# Patient Record
Sex: FEMALE | Race: WHITE
[De-identification: names, ages, dates, MRNs, and addresses within clinical notes are randomized per-mention and may not be internally consistent; named-entity substitution may affect disease eponyms.]

---

## 2021-05-21 ENCOUNTER — HOSPITAL ENCOUNTER (INPATIENT)
Dept: HOSPITAL 94 - ER | Age: 81
LOS: 1 days | Discharge: HOME HEALTH | DRG: 640 | End: 2021-05-22
Attending: GENERAL PRACTICE | Admitting: GENERAL PRACTICE
Payer: MEDICARE

## 2021-05-21 VITALS — DIASTOLIC BLOOD PRESSURE: 71 MMHG | SYSTOLIC BLOOD PRESSURE: 176 MMHG

## 2021-05-21 VITALS — DIASTOLIC BLOOD PRESSURE: 76 MMHG | SYSTOLIC BLOOD PRESSURE: 188 MMHG

## 2021-05-21 VITALS — WEIGHT: 129.28 LBS | HEIGHT: 62 IN | BODY MASS INDEX: 23.79 KG/M2

## 2021-05-21 VITALS — SYSTOLIC BLOOD PRESSURE: 165 MMHG | DIASTOLIC BLOOD PRESSURE: 56 MMHG

## 2021-05-21 VITALS — DIASTOLIC BLOOD PRESSURE: 56 MMHG | SYSTOLIC BLOOD PRESSURE: 165 MMHG

## 2021-05-21 VITALS — DIASTOLIC BLOOD PRESSURE: 65 MMHG | SYSTOLIC BLOOD PRESSURE: 185 MMHG

## 2021-05-21 DIAGNOSIS — Z88.0: ICD-10-CM

## 2021-05-21 DIAGNOSIS — Z91.013: ICD-10-CM

## 2021-05-21 DIAGNOSIS — D64.9: ICD-10-CM

## 2021-05-21 DIAGNOSIS — E04.2: ICD-10-CM

## 2021-05-21 DIAGNOSIS — I48.92: ICD-10-CM

## 2021-05-21 DIAGNOSIS — Z90.49: ICD-10-CM

## 2021-05-21 DIAGNOSIS — Z79.01: ICD-10-CM

## 2021-05-21 DIAGNOSIS — N18.30: ICD-10-CM

## 2021-05-21 DIAGNOSIS — D35.00: ICD-10-CM

## 2021-05-21 DIAGNOSIS — E86.0: Primary | ICD-10-CM

## 2021-05-21 DIAGNOSIS — I65.22: ICD-10-CM

## 2021-05-21 DIAGNOSIS — Z95.5: ICD-10-CM

## 2021-05-21 DIAGNOSIS — Z88.8: ICD-10-CM

## 2021-05-21 DIAGNOSIS — N17.0: ICD-10-CM

## 2021-05-21 DIAGNOSIS — E87.6: ICD-10-CM

## 2021-05-21 DIAGNOSIS — Z79.899: ICD-10-CM

## 2021-05-21 DIAGNOSIS — R55: ICD-10-CM

## 2021-05-21 DIAGNOSIS — Z79.82: ICD-10-CM

## 2021-05-21 DIAGNOSIS — E87.1: ICD-10-CM

## 2021-05-21 DIAGNOSIS — E78.5: ICD-10-CM

## 2021-05-21 DIAGNOSIS — I25.10: ICD-10-CM

## 2021-05-21 DIAGNOSIS — I12.9: ICD-10-CM

## 2021-05-21 DIAGNOSIS — Z87.891: ICD-10-CM

## 2021-05-21 DIAGNOSIS — Z90.710: ICD-10-CM

## 2021-05-21 LAB
ALBUMIN SERPL BCP-MCNC: 2.5 G/DL (ref 3.4–5)
ALBUMIN/GLOB SERPL: 0.6 {RATIO} (ref 1.1–1.5)
ALP SERPL-CCNC: 64 IU/L (ref 46–116)
ALT SERPL W P-5'-P-CCNC: 22 U/L (ref 12–78)
ANION GAP SERPL CALCULATED.3IONS-SCNC: 11 MMOL/L (ref 8–16)
APTT PPP: 36 SECONDS (ref 22–32)
AST SERPL W P-5'-P-CCNC: 20 U/L (ref 10–37)
BASOPHILS # BLD AUTO: 0 X10'3 (ref 0–0.2)
BASOPHILS NFR BLD AUTO: 0.4 % (ref 0–1)
BILIRUB SERPL-MCNC: 0.3 MG/DL (ref 0.1–1)
BUN SERPL-MCNC: 28 MG/DL (ref 7–18)
BUN/CREAT SERPL: 18.4 (ref 6.6–38)
CALCIUM SERPL-MCNC: 8.3 MG/DL (ref 8.5–10.1)
CHLORIDE SERPL-SCNC: 97 MMOL/L (ref 99–107)
CO2 SERPL-SCNC: 25.6 MMOL/L (ref 24–32)
CREAT SERPL-MCNC: 1.52 MG/DL (ref 0.4–0.9)
EOSINOPHIL # BLD AUTO: 0.1 X10'3 (ref 0–0.9)
EOSINOPHIL NFR BLD AUTO: 1.6 % (ref 0–6)
ERYTHROCYTE [DISTWIDTH] IN BLOOD BY AUTOMATED COUNT: 16.1 % (ref 11.5–14.5)
ERYTHROCYTE [DISTWIDTH] IN BLOOD BY AUTOMATED COUNT: 16.3 % (ref 11.5–14.5)
GFR SERPL CREATININE-BSD FRML MDRD: 33 ML/MIN
GLUCOSE SERPL-MCNC: 108 MG/DL (ref 70–104)
HCT VFR BLD AUTO: 23.6 % (ref 35–45)
HCT VFR BLD AUTO: 25.1 % (ref 35–45)
HGB BLD-MCNC: 7.8 G/DL (ref 12–16)
HGB BLD-MCNC: 8.2 G/DL (ref 12–16)
LYMPHOCYTES # BLD AUTO: 0.7 X10'3 (ref 1.1–4.8)
LYMPHOCYTES NFR BLD AUTO: 13.7 % (ref 21–51)
MAGNESIUM SERPL-MCNC: 1.8 MG/DL (ref 1.5–2.4)
MCH RBC QN AUTO: 26.8 PG (ref 27–31)
MCH RBC QN AUTO: 27 PG (ref 27–31)
MCHC RBC AUTO-ENTMCNC: 32.5 G/DL (ref 33–36.5)
MCHC RBC AUTO-ENTMCNC: 33 G/DL (ref 33–36.5)
MCV RBC AUTO: 81.1 FL (ref 78–98)
MCV RBC AUTO: 82.9 FL (ref 78–98)
MONOCYTES # BLD AUTO: 0.6 X10'3 (ref 0–0.9)
MONOCYTES NFR BLD AUTO: 10.8 % (ref 2–12)
NEUTROPHILS # BLD AUTO: 3.8 X10'3 (ref 1.8–7.7)
NEUTROPHILS NFR BLD AUTO: 73.5 % (ref 42–75)
PLATELET # BLD AUTO: 237 X10'3 (ref 140–440)
PLATELET # BLD AUTO: 250 X10'3 (ref 140–440)
PMV BLD AUTO: 6.6 FL (ref 7.4–10.4)
PMV BLD AUTO: 7 FL (ref 7.4–10.4)
POTASSIUM SERPL-SCNC: 3.4 MMOL/L (ref 3.5–5.1)
PROT SERPL-MCNC: 6.5 G/DL (ref 6.4–8.2)
RBC # BLD AUTO: 2.91 X10'6 (ref 4.2–5.6)
RBC # BLD AUTO: 3.03 X10'6 (ref 4.2–5.6)
SODIUM SERPL-SCNC: 134 MMOL/L (ref 135–145)
WBC # BLD AUTO: 4.7 X10'3 (ref 4.5–11)
WBC # BLD AUTO: 5.2 X10'3 (ref 4.5–11)

## 2021-05-21 PROCEDURE — 85730 THROMBOPLASTIN TIME PARTIAL: CPT

## 2021-05-21 PROCEDURE — 93005 ELECTROCARDIOGRAM TRACING: CPT

## 2021-05-21 PROCEDURE — 97530 THERAPEUTIC ACTIVITIES: CPT

## 2021-05-21 PROCEDURE — 85610 PROTHROMBIN TIME: CPT

## 2021-05-21 PROCEDURE — 74176 CT ABD & PELVIS W/O CONTRAST: CPT

## 2021-05-21 PROCEDURE — 83735 ASSAY OF MAGNESIUM: CPT

## 2021-05-21 PROCEDURE — 71045 X-RAY EXAM CHEST 1 VIEW: CPT

## 2021-05-21 PROCEDURE — 87081 CULTURE SCREEN ONLY: CPT

## 2021-05-21 PROCEDURE — 84443 ASSAY THYROID STIM HORMONE: CPT

## 2021-05-21 PROCEDURE — 76536 US EXAM OF HEAD AND NECK: CPT

## 2021-05-21 PROCEDURE — 84439 ASSAY OF FREE THYROXINE: CPT

## 2021-05-21 PROCEDURE — 85025 COMPLETE CBC W/AUTO DIFF WBC: CPT

## 2021-05-21 PROCEDURE — 80053 COMPREHEN METABOLIC PANEL: CPT

## 2021-05-21 PROCEDURE — 97161 PT EVAL LOW COMPLEX 20 MIN: CPT

## 2021-05-21 PROCEDURE — 36415 COLL VENOUS BLD VENIPUNCTURE: CPT

## 2021-05-21 PROCEDURE — 84484 ASSAY OF TROPONIN QUANT: CPT

## 2021-05-21 PROCEDURE — 85027 COMPLETE CBC AUTOMATED: CPT

## 2021-05-21 PROCEDURE — 82272 OCCULT BLD FECES 1-3 TESTS: CPT

## 2021-05-21 PROCEDURE — 83880 ASSAY OF NATRIURETIC PEPTIDE: CPT

## 2021-05-21 PROCEDURE — 99285 EMERGENCY DEPT VISIT HI MDM: CPT

## 2021-05-21 RX ADMIN — DIATRIZOATE MEGLUMINE AND DIATRIZOATE SODIUM SCH ML: 660; 100 LIQUID ORAL; RECTAL at 21:00

## 2021-05-21 RX ADMIN — POTASSIUM CHLORIDE PRN MEQ: 1500 TABLET, EXTENDED RELEASE ORAL at 20:51

## 2021-05-21 RX ADMIN — METOPROLOL TARTRATE SCH MG: 25 TABLET, FILM COATED ORAL at 20:52

## 2021-05-21 NOTE — NUR
Paging Tracy regarding 2 diet orders and needing clarification



PAGER ID: [phone]

MESSAGE: JUSTIN Doty RN [phone], New Admit [facility] coming to RM 3012A has 2 diet 
orders. please confirm either heart healthy or NPO

## 2021-05-21 NOTE — NUR
Patient in room PCU 3012. I have received report from  katerina cote   and had the opportunity 
to ask questions and assume patient care.

## 2021-05-21 NOTE — NUR
Problems reprioritized. Patient report given, questions answered & plan of care reviewed 
with Jace DIAZ.

## 2021-05-21 NOTE — NUR
notified md or positive orthos and elevated bp. Also asked Dr. Nixon for melatonin and 
something prn for pt for bp

## 2021-05-21 NOTE — NUR
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 md said 
to hold gastrografin since she has an allergy and no current abdominal symptoms

## 2021-05-22 VITALS — SYSTOLIC BLOOD PRESSURE: 170 MMHG | DIASTOLIC BLOOD PRESSURE: 64 MMHG

## 2021-05-22 VITALS — SYSTOLIC BLOOD PRESSURE: 148 MMHG | DIASTOLIC BLOOD PRESSURE: 66 MMHG

## 2021-05-22 VITALS — DIASTOLIC BLOOD PRESSURE: 66 MMHG | SYSTOLIC BLOOD PRESSURE: 132 MMHG

## 2021-05-22 VITALS — DIASTOLIC BLOOD PRESSURE: 68 MMHG | SYSTOLIC BLOOD PRESSURE: 140 MMHG

## 2021-05-22 VITALS — DIASTOLIC BLOOD PRESSURE: 69 MMHG | SYSTOLIC BLOOD PRESSURE: 170 MMHG

## 2021-05-22 LAB
ALBUMIN SERPL BCP-MCNC: 2.5 G/DL (ref 3.4–5)
ALBUMIN/GLOB SERPL: 0.6 {RATIO} (ref 1.1–1.5)
ALP SERPL-CCNC: 58 IU/L (ref 46–116)
ALT SERPL W P-5'-P-CCNC: 23 U/L (ref 12–78)
ANION GAP SERPL CALCULATED.3IONS-SCNC: 9 MMOL/L (ref 8–16)
AST SERPL W P-5'-P-CCNC: 22 U/L (ref 10–37)
BILIRUB SERPL-MCNC: 0.4 MG/DL (ref 0.1–1)
BUN SERPL-MCNC: 24 MG/DL (ref 7–18)
BUN/CREAT SERPL: 16.4 (ref 6.6–38)
CALCIUM SERPL-MCNC: 8.6 MG/DL (ref 8.5–10.1)
CHLORIDE SERPL-SCNC: 100 MMOL/L (ref 99–107)
CO2 SERPL-SCNC: 24.4 MMOL/L (ref 24–32)
CREAT SERPL-MCNC: 1.46 MG/DL (ref 0.4–0.9)
ERYTHROCYTE [DISTWIDTH] IN BLOOD BY AUTOMATED COUNT: 16.1 % (ref 11.5–14.5)
ERYTHROCYTE [DISTWIDTH] IN BLOOD BY AUTOMATED COUNT: 16.3 % (ref 11.5–14.5)
GFR SERPL CREATININE-BSD FRML MDRD: 34 ML/MIN
GLUCOSE SERPL-MCNC: 90 MG/DL (ref 70–104)
HCT VFR BLD AUTO: 24.4 % (ref 35–45)
HCT VFR BLD AUTO: 26.3 % (ref 35–45)
HEMOCCULT STL QL: NEGATIVE
HGB BLD-MCNC: 8.1 G/DL (ref 12–16)
HGB BLD-MCNC: 8.7 G/DL (ref 12–16)
MAGNESIUM SERPL-MCNC: 1.7 MG/DL (ref 1.5–2.4)
MCH RBC QN AUTO: 26.9 PG (ref 27–31)
MCH RBC QN AUTO: 26.9 PG (ref 27–31)
MCHC RBC AUTO-ENTMCNC: 33 G/DL (ref 33–36.5)
MCHC RBC AUTO-ENTMCNC: 33 G/DL (ref 33–36.5)
MCV RBC AUTO: 81.4 FL (ref 78–98)
MCV RBC AUTO: 81.6 FL (ref 78–98)
PLATELET # BLD AUTO: 255 X10'3 (ref 140–440)
PLATELET # BLD AUTO: 297 X10'3 (ref 140–440)
PMV BLD AUTO: 6.7 FL (ref 7.4–10.4)
PMV BLD AUTO: 7 FL (ref 7.4–10.4)
POTASSIUM SERPL-SCNC: 3.7 MMOL/L (ref 3.5–5.1)
PROT SERPL-MCNC: 6.4 G/DL (ref 6.4–8.2)
RBC # BLD AUTO: 3 X10'6 (ref 4.2–5.6)
RBC # BLD AUTO: 3.22 X10'6 (ref 4.2–5.6)
SODIUM SERPL-SCNC: 133 MMOL/L (ref 135–145)
WBC # BLD AUTO: 5.3 X10'3 (ref 4.5–11)
WBC # BLD AUTO: 5.3 X10'3 (ref 4.5–11)

## 2021-05-22 RX ADMIN — POTASSIUM CHLORIDE PRN MEQ: 1500 TABLET, EXTENDED RELEASE ORAL at 05:57

## 2021-05-22 RX ADMIN — DIATRIZOATE MEGLUMINE AND DIATRIZOATE SODIUM SCH ML: 660; 100 LIQUID ORAL; RECTAL at 06:49

## 2021-05-22 RX ADMIN — POTASSIUM CHLORIDE PRN MEQ: 1500 TABLET, EXTENDED RELEASE ORAL at 01:06

## 2021-05-22 RX ADMIN — METOPROLOL TARTRATE SCH MG: 25 TABLET, FILM COATED ORAL at 08:21

## 2021-05-22 NOTE — NUR
6 hr and 12 hr ekg was completed by rocael mendoza in the [facility], the 6 hr one was completed at 
1800 and 12 hr one was completed at 0000

## 2021-05-22 NOTE — NUR
Dr Molina is discussing CT of abdomen and pelvis and Ultra sound of thyroid results with 
patient in detail, prior to discharge. Dr. Molina is also discussing discharge from hospital 
with patient and answering questions she has.

## 2021-05-22 NOTE — NUR
Paged Dr. Molina regarding oral contrast for CT abd and pelvis held due to allergy to 
shellfish.



PAGER ID: [phone]

MESSAGE: JUSTIN Doty RN [phone], RM [id number] Darcie Lisa last night contrast Gastrografin was held 
by Dr. Nixon due to pat has allergy to shellfish.

## 2021-05-22 NOTE — NUR
Problems reprioritized. Patient report given, questions answered & plan of care reviewed 
with katerina cote.

## 2021-05-22 NOTE — NUR
Patient in room PCU 3012. I have received report from   Jace DIAZ and had the opportunity to 
ask questions and assume patient care.

## 2021-05-22 NOTE — NUR
Problems reprioritized. Patient report given, questions answered & plan of care reviewed 
with alyson cote.

## 2021-05-22 NOTE — NUR
Removed Telemetry and PIV with cannula intact, no redness or irritation at PIV site, Patient 
stable and comfortable at discharge. Spent time with patient and patient's friend reviewing 
discharge information and education. Answered all questions patient and patient's friend had 
about discharge information and education. Both patient and patient's friend were able to 
verbalize back all patient discharge information and education. Patient was wheeled out in 
wheelchair to lobby accompanied by patient's friend and staff member. Patient left hospital 
with friend in private vehicle.

## 2021-05-22 NOTE — NUR
gastrografin no administered b/c patient has a severe shellfish allergy which 
contraindicates the medication. Dr Patel said not to administer and did not give any other 
orders

## 2021-05-22 NOTE — NUR
paged DR. Molina regarding patient back from CT no results yet, patient requesting to eat.



PAGER ID: [phone]

MESSAGE: JUSTIN Doty RN 54, pt Darcie Lisa RM [id number] has completed CT abd & pelvis no 
results at this time, please advise when she can eat heart health diet

## 2021-05-24 NOTE — NUR
CASE MANAGEMENT DISCHARGE FOLLOW UP: Spoke with pt via telephone. Reports that she is doing 
really good, "feel[ing] better than [she] has in a long time," does admit to DANIELS that 
resolves spontaneously with rest, pt states not wearing O2 as O2 saturations around 98-99% 
on RA and she was instructed to wear O2 only if SpO2 less than 90%; denies CP, SOB, 
weakness, syncope/near-syncope, bleeding. Verbalizes understanding of s/sx requiring further 
evaluation/emergent assistance. Verbalizes understanding of medications. Verbalizes 
compliance with MD discharge instructions. Verbalizes understanding of the importance in 
making/keeping follow-up appointments, has f/u appointment scheduled with her PMD. Pt aware 
that she needs have f/u CT Chest 3-6 months and 18-24 months, also aware needs to f/u on 
adrenal mass as well as thyroid nodule. Pt states that PT will come out to work with her 
tomorrow (5/25/21). States no further questions/concerns at this time.

## 2022-03-24 ENCOUNTER — HOSPITAL ENCOUNTER (INPATIENT)
Dept: HOSPITAL 94 - ER | Age: 82
LOS: 5 days | Discharge: HOME HEALTH | DRG: 242 | End: 2022-03-29
Attending: INTERNAL MEDICINE | Admitting: INTERNAL MEDICINE
Payer: MEDICARE

## 2022-03-24 VITALS — DIASTOLIC BLOOD PRESSURE: 62 MMHG | SYSTOLIC BLOOD PRESSURE: 95 MMHG

## 2022-03-24 VITALS — DIASTOLIC BLOOD PRESSURE: 75 MMHG | SYSTOLIC BLOOD PRESSURE: 176 MMHG

## 2022-03-24 VITALS — SYSTOLIC BLOOD PRESSURE: 118 MMHG | DIASTOLIC BLOOD PRESSURE: 59 MMHG

## 2022-03-24 VITALS — DIASTOLIC BLOOD PRESSURE: 47 MMHG | SYSTOLIC BLOOD PRESSURE: 121 MMHG

## 2022-03-24 VITALS — DIASTOLIC BLOOD PRESSURE: 56 MMHG | SYSTOLIC BLOOD PRESSURE: 112 MMHG

## 2022-03-24 VITALS — WEIGHT: 138.89 LBS | HEIGHT: 64 IN | BODY MASS INDEX: 23.71 KG/M2

## 2022-03-24 VITALS — SYSTOLIC BLOOD PRESSURE: 212 MMHG | DIASTOLIC BLOOD PRESSURE: 80 MMHG

## 2022-03-24 VITALS — DIASTOLIC BLOOD PRESSURE: 39 MMHG | SYSTOLIC BLOOD PRESSURE: 101 MMHG

## 2022-03-24 VITALS — DIASTOLIC BLOOD PRESSURE: 35 MMHG | SYSTOLIC BLOOD PRESSURE: 136 MMHG

## 2022-03-24 VITALS — DIASTOLIC BLOOD PRESSURE: 89 MMHG | SYSTOLIC BLOOD PRESSURE: 152 MMHG

## 2022-03-24 DIAGNOSIS — I12.9: ICD-10-CM

## 2022-03-24 DIAGNOSIS — I48.0: ICD-10-CM

## 2022-03-24 DIAGNOSIS — Z95.5: ICD-10-CM

## 2022-03-24 DIAGNOSIS — Z87.891: ICD-10-CM

## 2022-03-24 DIAGNOSIS — R78.81: ICD-10-CM

## 2022-03-24 DIAGNOSIS — E78.5: ICD-10-CM

## 2022-03-24 DIAGNOSIS — N17.9: ICD-10-CM

## 2022-03-24 DIAGNOSIS — M25.512: ICD-10-CM

## 2022-03-24 DIAGNOSIS — Z79.02: ICD-10-CM

## 2022-03-24 DIAGNOSIS — Z88.8: ICD-10-CM

## 2022-03-24 DIAGNOSIS — Z79.899: ICD-10-CM

## 2022-03-24 DIAGNOSIS — Z90.49: ICD-10-CM

## 2022-03-24 DIAGNOSIS — Z91.013: ICD-10-CM

## 2022-03-24 DIAGNOSIS — R55: ICD-10-CM

## 2022-03-24 DIAGNOSIS — J96.01: ICD-10-CM

## 2022-03-24 DIAGNOSIS — I44.2: Primary | ICD-10-CM

## 2022-03-24 DIAGNOSIS — J45.909: ICD-10-CM

## 2022-03-24 DIAGNOSIS — I48.92: ICD-10-CM

## 2022-03-24 DIAGNOSIS — D64.9: ICD-10-CM

## 2022-03-24 DIAGNOSIS — R00.1: ICD-10-CM

## 2022-03-24 DIAGNOSIS — N18.32: ICD-10-CM

## 2022-03-24 DIAGNOSIS — I25.10: ICD-10-CM

## 2022-03-24 LAB
ALBUMIN SERPL BCP-MCNC: 3.4 G/DL (ref 3.4–5)
ALBUMIN/GLOB SERPL: 1.2 {RATIO} (ref 1.1–1.5)
ALP SERPL-CCNC: 58 IU/L (ref 46–116)
ALT SERPL W P-5'-P-CCNC: 29 U/L (ref 12–78)
ANION GAP SERPL CALCULATED.3IONS-SCNC: 15 MMOL/L (ref 8–16)
APTT PPP: 23 SECONDS (ref 22–32)
ARTERIAL PATENCY WRIST A: POSITIVE
AST SERPL W P-5'-P-CCNC: 29 U/L (ref 10–37)
BASE EXCESS BLDA CALC-SCNC: -4.6 MMOL/L (ref -2–2)
BASE EXCESS BLDA CALC-SCNC: -4.9 MMOL/L (ref -2–2)
BASOPHILS # BLD AUTO: 0 X10'3 (ref 0–0.2)
BASOPHILS NFR BLD AUTO: 0.3 % (ref 0–1)
BILIRUB SERPL-MCNC: 0.2 MG/DL (ref 0.1–1)
BODY TEMPERATURE: 37
BODY TEMPERATURE: 37
BUN SERPL-MCNC: 51 MG/DL (ref 7–18)
BUN/CREAT SERPL: 26.4 (ref 6.6–38)
CALCIUM SERPL-MCNC: 8.2 MG/DL (ref 8.5–10.1)
CHLORIDE SERPL-SCNC: 106 MMOL/L (ref 99–107)
CO2 SERPL-SCNC: 22.4 MMOL/L (ref 24–32)
COHGB MFR BLDA: 0.3 % (ref 0–3.9)
COHGB MFR BLDA: 0.6 % (ref 0–3.9)
CREAT SERPL-MCNC: 1.93 MG/DL (ref 0.4–0.9)
EOSINOPHIL # BLD AUTO: 0 X10'3 (ref 0–0.9)
EOSINOPHIL NFR BLD AUTO: 0.9 % (ref 0–6)
EOSINOPHIL NFR BLD MANUAL: 2 % (ref 0–6)
ERYTHROCYTE [DISTWIDTH] IN BLOOD BY AUTOMATED COUNT: 14.5 % (ref 11.5–14.5)
GFR SERPL CREATININE-BSD FRML MDRD: 25 ML/MIN
GLUCOSE SERPL-MCNC: 143 MG/DL (ref 70–104)
HCO3 BLDA-SCNC: 21.8 MMOL/L (ref 22–26)
HCO3 BLDA-SCNC: 22.5 MMOL/L (ref 22–26)
HCT VFR BLD AUTO: 29.3 % (ref 35–45)
HGB BLD-MCNC: 9.6 G/DL (ref 12–16)
HGB BLDA-MCNC: 10.6 G/DL (ref 12–16)
HGB BLDA-MCNC: 9 G/DL (ref 12–16)
LDLC SERPL DIRECT ASSAY-MCNC: 104 MG/DL (ref 50–100)
LYMPHOCYTES # BLD AUTO: 0.6 X10'3 (ref 1.1–4.8)
LYMPHOCYTES NFR BLD AUTO: 20.9 % (ref 21–51)
LYMPHOCYTES NFR BLD MANUAL: 18 % (ref 21–51)
MCH RBC QN AUTO: 29.3 PG (ref 27–31)
MCHC RBC AUTO-ENTMCNC: 32.8 G/DL (ref 33–36.5)
MCV RBC AUTO: 89.1 FL (ref 78–98)
METHGB MFR BLDA: 0 % (ref 0–1.5)
METHGB MFR BLDA: 0.3 % (ref 0–1.5)
MONOCYTES # BLD AUTO: 0.2 X10'3 (ref 0–0.9)
MONOCYTES NFR BLD AUTO: 6.6 % (ref 2–12)
MONOCYTES NFR BLD MANUAL: 7 % (ref 2–12)
NEUTROPHILS # BLD AUTO: 1.9 X10'3 (ref 1.8–7.7)
NEUTROPHILS NFR BLD AUTO: 71.3 % (ref 42–75)
NEUTS BAND # BLD MANUAL: 1 % (ref 0–10)
NEUTS SEG NFR BLD MANUAL: 72 % (ref 42–75)
O2/TOTAL GAS SETTING VFR VENT: 100 MMHG/%
O2/TOTAL GAS SETTING VFR VENT: 30 MMHG/%
OXYHGB MFR BLDA: 90.7 % (ref 94–97)
OXYHGB MFR BLDA: 98.9 % (ref 94–97)
PCO2 TEMP ADJ BLDA: 46.3 MMHG (ref 32–45)
PCO2 TEMP ADJ BLDA: 52.3 MMHG (ref 32–45)
PEEP RESPIRATORY: 5 CM H2O
PEEP RESPIRATORY: 5 CM H2O
PLATELET # BLD AUTO: 117 X10'3 (ref 140–440)
PLATELET BLD QL SMEAR: (no result)
PMV BLD AUTO: 8 FL (ref 7.4–10.4)
PO2 TEMP ADJ BLD: 443 MMHG (ref 75–100)
PO2 TEMP ADJ BLD: 69.7 MMHG (ref 75–100)
POTASSIUM SERPL-SCNC: 4.2 MMOL/L (ref 3.5–5.1)
PROT SERPL-MCNC: 6.2 G/DL (ref 6.4–8.2)
RBC # BLD AUTO: 3.29 X10'6 (ref 4.2–5.6)
RBC MORPH BLD: NORMAL
RESP RATE 1H: 12 B/MIN
RESP RATE 1H: 16 B/MIN
SAO2 % BLDA: 91.5 % (ref 94–97)
SAO2 % BLDA: 99.2 % (ref 94–97)
SODIUM SERPL-SCNC: 143 MMOL/L (ref 135–145)
SPONT VT COMPRES GAS VOL SET UNCOR VENT: 400 ML
SPONT VT COMPRES GAS VOL SET UNCOR VENT: 450 ML
TOTAL CELLS COUNTED FLD: 100
WBC # BLD AUTO: 2.7 X10'3 (ref 4.5–11)

## 2022-03-24 PROCEDURE — 5A1945Z RESPIRATORY VENTILATION, 24-96 CONSECUTIVE HOURS: ICD-10-PCS | Performed by: EMERGENCY MEDICINE

## 2022-03-24 PROCEDURE — 5A1223Z PERFORMANCE OF CARDIAC PACING, CONTINUOUS: ICD-10-PCS | Performed by: EMERGENCY MEDICINE

## 2022-03-24 PROCEDURE — 29700 RMVL/BIVLV GAUNTLET BOOT/CST: CPT

## 2022-03-24 PROCEDURE — 04JY3ZZ INSPECTION OF LOWER ARTERY, PERCUTANEOUS APPROACH: ICD-10-PCS | Performed by: EMERGENCY MEDICINE

## 2022-03-24 PROCEDURE — 99153 MOD SED SAME PHYS/QHP EA: CPT

## 2022-03-24 PROCEDURE — 87070 CULTURE OTHR SPECIMN AEROBIC: CPT

## 2022-03-24 PROCEDURE — 94799 UNLISTED PULMONARY SVC/PX: CPT

## 2022-03-24 PROCEDURE — 94640 AIRWAY INHALATION TREATMENT: CPT

## 2022-03-24 PROCEDURE — 02H63JZ INSERTION OF PACEMAKER LEAD INTO RIGHT ATRIUM, PERCUTANEOUS APPROACH: ICD-10-PCS | Performed by: EMERGENCY MEDICINE

## 2022-03-24 PROCEDURE — 71045 X-RAY EXAM CHEST 1 VIEW: CPT

## 2022-03-24 PROCEDURE — 83735 ASSAY OF MAGNESIUM: CPT

## 2022-03-24 PROCEDURE — 93005 ELECTROCARDIOGRAM TRACING: CPT

## 2022-03-24 PROCEDURE — 84443 ASSAY THYROID STIM HORMONE: CPT

## 2022-03-24 PROCEDURE — 85018 HEMOGLOBIN: CPT

## 2022-03-24 PROCEDURE — 94003 VENT MGMT INPAT SUBQ DAY: CPT

## 2022-03-24 PROCEDURE — 84484 ASSAY OF TROPONIN QUANT: CPT

## 2022-03-24 PROCEDURE — 87040 BLOOD CULTURE FOR BACTERIA: CPT

## 2022-03-24 PROCEDURE — 36600 WITHDRAWAL OF ARTERIAL BLOOD: CPT

## 2022-03-24 PROCEDURE — 99291 CRITICAL CARE FIRST HOUR: CPT

## 2022-03-24 PROCEDURE — 0BH17EZ INSERTION OF ENDOTRACHEAL AIRWAY INTO TRACHEA, VIA NATURAL OR ARTIFICIAL OPENING: ICD-10-PCS | Performed by: EMERGENCY MEDICINE

## 2022-03-24 PROCEDURE — 33208 INSRT HEART PM ATRIAL & VENT: CPT

## 2022-03-24 PROCEDURE — 84439 ASSAY OF FREE THYROXINE: CPT

## 2022-03-24 PROCEDURE — B54BZZA ULTRASONOGRAPHY OF RIGHT LOWER EXTREMITY VEINS, GUIDANCE: ICD-10-PCS | Performed by: EMERGENCY MEDICINE

## 2022-03-24 PROCEDURE — 85025 COMPLETE CBC W/AUTO DIFF WBC: CPT

## 2022-03-24 PROCEDURE — 83721 ASSAY OF BLOOD LIPOPROTEIN: CPT

## 2022-03-24 PROCEDURE — 0JH607Z INSERTION OF CARDIAC RESYNCHRONIZATION PACEMAKER PULSE GENERATOR INTO CHEST SUBCUTANEOUS TISSUE AND FASCIA, OPEN APPROACH: ICD-10-PCS | Performed by: EMERGENCY MEDICINE

## 2022-03-24 PROCEDURE — 36415 COLL VENOUS BLD VENIPUNCTURE: CPT

## 2022-03-24 PROCEDURE — 80053 COMPREHEN METABOLIC PANEL: CPT

## 2022-03-24 PROCEDURE — 82948 REAGENT STRIP/BLOOD GLUCOSE: CPT

## 2022-03-24 PROCEDURE — 99152 MOD SED SAME PHYS/QHP 5/>YRS: CPT

## 2022-03-24 PROCEDURE — 82803 BLOOD GASES ANY COMBINATION: CPT

## 2022-03-24 PROCEDURE — 97530 THERAPEUTIC ACTIVITIES: CPT

## 2022-03-24 PROCEDURE — 85007 BL SMEAR W/DIFF WBC COUNT: CPT

## 2022-03-24 PROCEDURE — 94760 N-INVAS EAR/PLS OXIMETRY 1: CPT

## 2022-03-24 PROCEDURE — 83880 ASSAY OF NATRIURETIC PEPTIDE: CPT

## 2022-03-24 PROCEDURE — 85730 THROMBOPLASTIN TIME PARTIAL: CPT

## 2022-03-24 PROCEDURE — 02HK3JZ INSERTION OF PACEMAKER LEAD INTO RIGHT VENTRICLE, PERCUTANEOUS APPROACH: ICD-10-PCS | Performed by: EMERGENCY MEDICINE

## 2022-03-24 PROCEDURE — 84145 PROCALCITONIN (PCT): CPT

## 2022-03-24 PROCEDURE — 94002 VENT MGMT INPAT INIT DAY: CPT

## 2022-03-24 PROCEDURE — 83605 ASSAY OF LACTIC ACID: CPT

## 2022-03-24 PROCEDURE — 06HY33Z INSERTION OF INFUSION DEVICE INTO LOWER VEIN, PERCUTANEOUS APPROACH: ICD-10-PCS | Performed by: EMERGENCY MEDICINE

## 2022-03-24 PROCEDURE — 97161 PT EVAL LOW COMPLEX 20 MIN: CPT

## 2022-03-24 PROCEDURE — 84100 ASSAY OF PHOSPHORUS: CPT

## 2022-03-24 PROCEDURE — 93306 TTE W/DOPPLER COMPLETE: CPT

## 2022-03-24 RX ADMIN — SODIUM CHLORIDE SCH MLS/HR: 9 INJECTION INTRAMUSCULAR; INTRAVENOUS; SUBCUTANEOUS at 12:45

## 2022-03-24 RX ADMIN — DOCUSATE SODIUM SCH MG: 100 CAPSULE, LIQUID FILLED ORAL at 20:00

## 2022-03-24 RX ADMIN — HEPARIN SODIUM SCH UNIT: 5000 INJECTION, SOLUTION INTRAVENOUS; SUBCUTANEOUS at 21:57

## 2022-03-24 RX ADMIN — Medication PRN MLS/HR: at 09:00

## 2022-03-24 RX ADMIN — Medication PRN MLS/HR: at 23:15

## 2022-03-24 RX ADMIN — SODIUM CHLORIDE SCH MLS/HR: 9 INJECTION INTRAMUSCULAR; INTRAVENOUS; SUBCUTANEOUS at 11:55

## 2022-03-24 RX ADMIN — Medication PRN MLS/HR: at 22:35

## 2022-03-24 RX ADMIN — SODIUM CHLORIDE SCH MLS/HR: 9 INJECTION INTRAMUSCULAR; INTRAVENOUS; SUBCUTANEOUS at 21:55

## 2022-03-24 RX ADMIN — Medication PRN MLS/HR: at 08:49

## 2022-03-24 NOTE — NUR
Patient up to floor from ED and placed on bedside monitor at 1800. Heart rate paced at 80bpm 
via trans venous pacer in through the R. IJ. 40ml/hour of fluid running through introducer 
sheath. Patient awake and oriented in room and following commands. Fentanyl at 35mcg and 
Versed at 4mg/hour. Right groin with notable hematoma where ART line removed in ED; improved 
per ED RN. Right lower extremity cooler than left; dorsalis pulse dopplered and present. 
Skin clear. Vital signs stable at this time. Trinh RN at bedside with all concerns noted 
and all questions answered.

## 2022-03-24 NOTE — NUR
PT INTUBATED AT 0829, 7.5 ETT, 22 AT TEETH.

0827- 20MG ETOMIDATE

0828- 100MG EDWIN 

0831- 0.5MG ATROPINE

## 2022-03-24 NOTE — NUR
pt bp is low 85/55 map is 65 ,consulted with nora cote who has icu experience 
,as per her just decrease the versed to 3 mg/hr and see if that can help with 
the bp .will keep an eye on pt bp and pt sedation staus .

## 2022-03-24 NOTE — NUR
VERBAL ORDER FOR N.S 1L BOLUS FOR LOW BP ,PT EF IS OKAY AS CHECKED BY DR CHE ,US TECH AT BEDSIDE .HAILY MCCURDY AND DR BELCHER ALONG WITH PT 
GRAND DAUGHTER AT BEDSIDE ,DISCUSSING THE POC.

## 2022-03-24 NOTE — NUR
Problems reprioritized. Patient report given, questions answered & plan of care reviewed 
with Trinh DIAZ.

## 2022-03-24 NOTE — NUR
pt bp gets low with sedation meds ,pt bp was in 80's on 50 mcg/hr of fentnyl 
and 5 mg/hr versed .titrated the fentnyl to 35 mcg/hr and versed to 3 mg/hr ,pt 
bp went up to 154/62.retitrated the versed drip to 4 and given 1 mg bolus 
versed and kept the fentnyl at 35 mcg hr.

jairo nurse at bedside.

## 2022-03-25 VITALS — SYSTOLIC BLOOD PRESSURE: 150 MMHG | DIASTOLIC BLOOD PRESSURE: 67 MMHG

## 2022-03-25 VITALS — DIASTOLIC BLOOD PRESSURE: 48 MMHG | SYSTOLIC BLOOD PRESSURE: 145 MMHG

## 2022-03-25 VITALS — DIASTOLIC BLOOD PRESSURE: 83 MMHG | SYSTOLIC BLOOD PRESSURE: 125 MMHG

## 2022-03-25 VITALS — SYSTOLIC BLOOD PRESSURE: 101 MMHG | DIASTOLIC BLOOD PRESSURE: 38 MMHG

## 2022-03-25 VITALS — SYSTOLIC BLOOD PRESSURE: 130 MMHG | DIASTOLIC BLOOD PRESSURE: 45 MMHG

## 2022-03-25 VITALS — SYSTOLIC BLOOD PRESSURE: 139 MMHG | DIASTOLIC BLOOD PRESSURE: 61 MMHG

## 2022-03-25 VITALS — SYSTOLIC BLOOD PRESSURE: 131 MMHG | DIASTOLIC BLOOD PRESSURE: 49 MMHG

## 2022-03-25 VITALS — SYSTOLIC BLOOD PRESSURE: 125 MMHG | DIASTOLIC BLOOD PRESSURE: 48 MMHG

## 2022-03-25 VITALS — DIASTOLIC BLOOD PRESSURE: 39 MMHG | SYSTOLIC BLOOD PRESSURE: 131 MMHG

## 2022-03-25 VITALS — SYSTOLIC BLOOD PRESSURE: 128 MMHG | DIASTOLIC BLOOD PRESSURE: 41 MMHG

## 2022-03-25 VITALS — DIASTOLIC BLOOD PRESSURE: 52 MMHG | SYSTOLIC BLOOD PRESSURE: 166 MMHG

## 2022-03-25 VITALS — DIASTOLIC BLOOD PRESSURE: 31 MMHG | SYSTOLIC BLOOD PRESSURE: 94 MMHG

## 2022-03-25 VITALS — SYSTOLIC BLOOD PRESSURE: 171 MMHG | DIASTOLIC BLOOD PRESSURE: 68 MMHG

## 2022-03-25 VITALS — SYSTOLIC BLOOD PRESSURE: 154 MMHG | DIASTOLIC BLOOD PRESSURE: 49 MMHG

## 2022-03-25 VITALS — DIASTOLIC BLOOD PRESSURE: 58 MMHG | SYSTOLIC BLOOD PRESSURE: 145 MMHG

## 2022-03-25 VITALS — SYSTOLIC BLOOD PRESSURE: 155 MMHG | DIASTOLIC BLOOD PRESSURE: 63 MMHG

## 2022-03-25 VITALS — DIASTOLIC BLOOD PRESSURE: 98 MMHG | SYSTOLIC BLOOD PRESSURE: 169 MMHG

## 2022-03-25 VITALS — DIASTOLIC BLOOD PRESSURE: 70 MMHG | SYSTOLIC BLOOD PRESSURE: 142 MMHG

## 2022-03-25 VITALS — DIASTOLIC BLOOD PRESSURE: 49 MMHG | SYSTOLIC BLOOD PRESSURE: 160 MMHG

## 2022-03-25 VITALS — SYSTOLIC BLOOD PRESSURE: 105 MMHG | DIASTOLIC BLOOD PRESSURE: 37 MMHG

## 2022-03-25 VITALS — DIASTOLIC BLOOD PRESSURE: 44 MMHG | SYSTOLIC BLOOD PRESSURE: 112 MMHG

## 2022-03-25 VITALS — DIASTOLIC BLOOD PRESSURE: 65 MMHG | SYSTOLIC BLOOD PRESSURE: 145 MMHG

## 2022-03-25 VITALS — SYSTOLIC BLOOD PRESSURE: 142 MMHG | DIASTOLIC BLOOD PRESSURE: 61 MMHG

## 2022-03-25 LAB
ALBUMIN SERPL BCP-MCNC: 3.2 G/DL (ref 3.4–5)
ALBUMIN/GLOB SERPL: 1.2 {RATIO} (ref 1.1–1.5)
ALP SERPL-CCNC: 44 IU/L (ref 46–116)
ALT SERPL W P-5'-P-CCNC: 29 U/L (ref 12–78)
ANION GAP SERPL CALCULATED.3IONS-SCNC: 15 MMOL/L (ref 8–16)
ARTERIAL PATENCY WRIST A: POSITIVE
ARTERIAL PATENCY WRIST A: POSITIVE
AST SERPL W P-5'-P-CCNC: 27 U/L (ref 10–37)
BASE EXCESS BLDA CALC-SCNC: -5 MMOL/L (ref -2–2)
BASE EXCESS BLDA CALC-SCNC: -6.7 MMOL/L (ref -2–2)
BASOPHILS # BLD AUTO: 0 X10'3 (ref 0–0.2)
BASOPHILS NFR BLD AUTO: 0.2 % (ref 0–1)
BILIRUB SERPL-MCNC: 0.3 MG/DL (ref 0.1–1)
BODY TEMPERATURE: 37.5
BODY TEMPERATURE: 38.2
BUN SERPL-MCNC: 44 MG/DL (ref 7–18)
BUN/CREAT SERPL: 28.8 (ref 6.6–38)
CALCIUM SERPL-MCNC: 8.4 MG/DL (ref 8.5–10.1)
CHLORIDE SERPL-SCNC: 109 MMOL/L (ref 99–107)
CO2 SERPL-SCNC: 20.5 MMOL/L (ref 24–32)
COHGB MFR BLDA: 0.3 % (ref 0–3.9)
COHGB MFR BLDA: 0.3 % (ref 0–3.9)
CREAT SERPL-MCNC: 1.53 MG/DL (ref 0.4–0.9)
EOSINOPHIL # BLD AUTO: 0 X10'3 (ref 0–0.9)
EOSINOPHIL NFR BLD AUTO: 0.3 % (ref 0–6)
ERYTHROCYTE [DISTWIDTH] IN BLOOD BY AUTOMATED COUNT: 14.8 % (ref 11.5–14.5)
GFR SERPL CREATININE-BSD FRML MDRD: 32 ML/MIN
GLUCOSE SERPL-MCNC: 111 MG/DL (ref 70–104)
HCO3 BLDA-SCNC: 17.7 MMOL/L (ref 22–26)
HCO3 BLDA-SCNC: 19.2 MMOL/L (ref 22–26)
HCT VFR BLD AUTO: 27.8 % (ref 35–45)
HGB BLD-MCNC: 9.1 G/DL (ref 12–16)
HGB BLDA-MCNC: 9.3 G/DL (ref 12–16)
HGB BLDA-MCNC: 9.4 G/DL (ref 12–16)
LYMPHOCYTES # BLD AUTO: 1 X10'3 (ref 1.1–4.8)
LYMPHOCYTES NFR BLD AUTO: 13 % (ref 21–51)
MAGNESIUM SERPL-MCNC: 2.2 MG/DL (ref 1.5–2.4)
MCH RBC QN AUTO: 29 PG (ref 27–31)
MCHC RBC AUTO-ENTMCNC: 32.7 G/DL (ref 33–36.5)
MCV RBC AUTO: 88.6 FL (ref 78–98)
METHGB MFR BLDA: 0.2 % (ref 0–1.5)
METHGB MFR BLDA: 0.3 % (ref 0–1.5)
MONOCYTES # BLD AUTO: 0.6 X10'3 (ref 0–0.9)
MONOCYTES NFR BLD AUTO: 8.5 % (ref 2–12)
NEUTROPHILS # BLD AUTO: 5.8 X10'3 (ref 1.8–7.7)
NEUTROPHILS NFR BLD AUTO: 78 % (ref 42–75)
O2/TOTAL GAS SETTING VFR VENT: 35 MMHG/%
O2/TOTAL GAS SETTING VFR VENT: 45 MMHG/%
OXYHGB MFR BLDA: 95.2 % (ref 94–97)
OXYHGB MFR BLDA: 96.9 % (ref 94–97)
PCO2 TEMP ADJ BLDA: 31.6 MMHG (ref 32–45)
PCO2 TEMP ADJ BLDA: 34.4 MMHG (ref 32–45)
PEEP RESPIRATORY: 5 CM H2O
PHOSPHATE SERPL-MCNC: 3.1 MG/DL (ref 2.3–4.5)
PLATELET # BLD AUTO: 142 X10'3 (ref 140–440)
PMV BLD AUTO: 8.7 FL (ref 7.4–10.4)
PO2 TEMP ADJ BLD: 117.3 MMHG (ref 75–100)
PO2 TEMP ADJ BLD: 93.3 MMHG (ref 75–100)
POTASSIUM SERPL-SCNC: 4.2 MMOL/L (ref 3.5–5.1)
PROT SERPL-MCNC: 5.8 G/DL (ref 6.4–8.2)
RBC # BLD AUTO: 3.13 X10'6 (ref 4.2–5.6)
RESP RATE 1H: 12 B/MIN
SAO2 % BLDA: 95.7 % (ref 94–97)
SAO2 % BLDA: 97.5 % (ref 94–97)
SODIUM SERPL-SCNC: 144 MMOL/L (ref 135–145)
SPONT VT COMPRES GAS VOL SET UNCOR VENT: 450 ML
WBC # BLD AUTO: 7.5 X10'3 (ref 4.5–11)

## 2022-03-25 RX ADMIN — MINERAL OIL, PETROLATUM SCH INCH: 425; 568 OINTMENT OPHTHALMIC at 19:22

## 2022-03-25 RX ADMIN — METHYLPREDNISOLONE SODIUM SUCCINATE SCH MG: 40 INJECTION, POWDER, FOR SOLUTION INTRAMUSCULAR; INTRAVENOUS at 23:09

## 2022-03-25 RX ADMIN — MINERAL OIL, PETROLATUM SCH INCH: 425; 568 OINTMENT OPHTHALMIC at 14:00

## 2022-03-25 RX ADMIN — HEPARIN SODIUM SCH UNIT: 5000 INJECTION, SOLUTION INTRAVENOUS; SUBCUTANEOUS at 19:22

## 2022-03-25 RX ADMIN — PANTOPRAZOLE SODIUM SCH MLS/HR: 40 INJECTION, POWDER, FOR SOLUTION INTRAVENOUS at 07:57

## 2022-03-25 RX ADMIN — IPRATROPIUM BROMIDE AND ALBUTEROL SULFATE PRN ML: .5; 3 SOLUTION RESPIRATORY (INHALATION) at 22:33

## 2022-03-25 RX ADMIN — DOCUSATE SODIUM SCH MG: 100 CAPSULE, LIQUID FILLED ORAL at 08:00

## 2022-03-25 RX ADMIN — SODIUM CHLORIDE SCH MLS/HR: 9 INJECTION INTRAMUSCULAR; INTRAVENOUS; SUBCUTANEOUS at 07:55

## 2022-03-25 RX ADMIN — DOCUSATE SODIUM SCH MG: 100 CAPSULE, LIQUID FILLED ORAL at 19:23

## 2022-03-25 RX ADMIN — IPRATROPIUM BROMIDE AND ALBUTEROL SULFATE PRN ML: .5; 3 SOLUTION RESPIRATORY (INHALATION) at 23:03

## 2022-03-25 RX ADMIN — HEPARIN SODIUM SCH UNIT: 5000 INJECTION, SOLUTION INTRAVENOUS; SUBCUTANEOUS at 08:00

## 2022-03-25 NOTE — NUR
Patient wheeled out to cathlab for insertion of a dual chamber pace maker. Surgical consent 
was obtained from her grand daughter.

## 2022-03-25 NOTE — NUR
Initial: Pt admit for heart block with acute respiratory failure. Pt 

currently to cath lab for placement of pacemaker. Pt with an OGT in place 

though not to start TF at this time. Will place recommendations below for 

if expected prolonged intubation and to receive nutrition support. [facility] 

3/25. Will continue to follow closely.

Recommendations:

1) IF TF, continuous Vital AF with 55 mL/hr goal rate. Begin at 25 mL/hr 

and advance by 30 mL Q8H as tolerated to goal rate

2) IF TF, additional 100 mL water flush Q4H; monitor serum Na

3) IF TF, prealbumin q Monday/Thursday; daily scaled weights

4) Routine bowel care

-------------------------------------------------------------------------------

Addendum: 03/25/22 at 1513 by Una Fleming RD

-------------------------------------------------------------------------------

Amended: Links added.

## 2022-03-25 NOTE — NUR
NOTE IN ADDITION TO SHIFT NOTE:



1900 PT remains intubated, mode CPAP, FIO2 35% PEEP 5, PS 8, Awake alert, follows all 
commands. restraints in place. 

1954 Resp check ABG PH 7.37, PCO2 30.9, PO2 114.2, HCO3 17.7, SAO2 97.5%.

2000 RT check weaning Parameters,

2023 Post Pacer CXR : lines and tubes in satisfactory position.

       Mild subsegmental atelectasis is developing in left lung base. 

2030 Dr Pedraza OK to extubate. 

2040 RT extubated Patient to O2 2L NC. saturation 100% lungs clear. Restraints removed. 

2140 Cordis  removed from right EJ. Suture removed, site cleansed, Pt flat for removal, 
remains flat for 20 minutes after pressure held for 10 minutes. then pressure dressing 
applied to right neck. 

2210 Pt bed bath, sheets changed.

2220 Pt develops audible I/E wheezes, RT to give resp tx. 

2245 pt continues with wheezes, Called Dr Pedraza, ordered another resp treatment, and ordered 
Solumederol 40 mg Q8 hours times 3 doses. 

2300 Pt c/o discomfort in her left shoulder/chest area. Tylenol given orally. Solumederol 40 
mg IV given. 

2330 Pt resting, congested cough remains. Saturation 100%, RR 17

-------------------------------------------------------------------------------

Addendum: 03/26/22 at 0519 by Kelvin Nath RN

-------------------------------------------------------------------------------

am bp 204/88 called Dr Salgado said to give Catapress early.

## 2022-03-25 NOTE — NUR
Patient received from night staff in a stable condition. Transvenous pacemaker in place 
pacing at 80bph. Patient is expected to have a permanent/internal pacer in today.

## 2022-03-25 NOTE — NUR
Patient returned from cathlab post insertion of a dual chamber pacemaker in the left upper 
chest, patient was sedated wit 1mg of versed and 50mg of fentanyl. 10mg of Cardizem 
administered during procedure for systolic of  230mmhg.

## 2022-03-26 VITALS — SYSTOLIC BLOOD PRESSURE: 165 MMHG | DIASTOLIC BLOOD PRESSURE: 86 MMHG

## 2022-03-26 VITALS — DIASTOLIC BLOOD PRESSURE: 70 MMHG | SYSTOLIC BLOOD PRESSURE: 180 MMHG

## 2022-03-26 VITALS — DIASTOLIC BLOOD PRESSURE: 49 MMHG | SYSTOLIC BLOOD PRESSURE: 166 MMHG

## 2022-03-26 VITALS — DIASTOLIC BLOOD PRESSURE: 88 MMHG | SYSTOLIC BLOOD PRESSURE: 204 MMHG

## 2022-03-26 VITALS — DIASTOLIC BLOOD PRESSURE: 70 MMHG | SYSTOLIC BLOOD PRESSURE: 120 MMHG

## 2022-03-26 VITALS — DIASTOLIC BLOOD PRESSURE: 55 MMHG | SYSTOLIC BLOOD PRESSURE: 129 MMHG

## 2022-03-26 VITALS — SYSTOLIC BLOOD PRESSURE: 144 MMHG | DIASTOLIC BLOOD PRESSURE: 79 MMHG

## 2022-03-26 VITALS — DIASTOLIC BLOOD PRESSURE: 59 MMHG | SYSTOLIC BLOOD PRESSURE: 179 MMHG

## 2022-03-26 VITALS — DIASTOLIC BLOOD PRESSURE: 80 MMHG | SYSTOLIC BLOOD PRESSURE: 181 MMHG

## 2022-03-26 VITALS — SYSTOLIC BLOOD PRESSURE: 154 MMHG | DIASTOLIC BLOOD PRESSURE: 68 MMHG

## 2022-03-26 VITALS — SYSTOLIC BLOOD PRESSURE: 162 MMHG | DIASTOLIC BLOOD PRESSURE: 74 MMHG

## 2022-03-26 VITALS — DIASTOLIC BLOOD PRESSURE: 66 MMHG | SYSTOLIC BLOOD PRESSURE: 156 MMHG

## 2022-03-26 VITALS — SYSTOLIC BLOOD PRESSURE: 154 MMHG | DIASTOLIC BLOOD PRESSURE: 56 MMHG

## 2022-03-26 VITALS — SYSTOLIC BLOOD PRESSURE: 187 MMHG | DIASTOLIC BLOOD PRESSURE: 74 MMHG

## 2022-03-26 VITALS — DIASTOLIC BLOOD PRESSURE: 55 MMHG | SYSTOLIC BLOOD PRESSURE: 141 MMHG

## 2022-03-26 VITALS — SYSTOLIC BLOOD PRESSURE: 104 MMHG | DIASTOLIC BLOOD PRESSURE: 35 MMHG

## 2022-03-26 VITALS — SYSTOLIC BLOOD PRESSURE: 154 MMHG | DIASTOLIC BLOOD PRESSURE: 61 MMHG

## 2022-03-26 VITALS — SYSTOLIC BLOOD PRESSURE: 164 MMHG | DIASTOLIC BLOOD PRESSURE: 79 MMHG

## 2022-03-26 VITALS — SYSTOLIC BLOOD PRESSURE: 148 MMHG | DIASTOLIC BLOOD PRESSURE: 66 MMHG

## 2022-03-26 VITALS — SYSTOLIC BLOOD PRESSURE: 157 MMHG | DIASTOLIC BLOOD PRESSURE: 71 MMHG

## 2022-03-26 VITALS — DIASTOLIC BLOOD PRESSURE: 76 MMHG | SYSTOLIC BLOOD PRESSURE: 187 MMHG

## 2022-03-26 VITALS — DIASTOLIC BLOOD PRESSURE: 63 MMHG | SYSTOLIC BLOOD PRESSURE: 172 MMHG

## 2022-03-26 VITALS — SYSTOLIC BLOOD PRESSURE: 130 MMHG | DIASTOLIC BLOOD PRESSURE: 78 MMHG

## 2022-03-26 VITALS — DIASTOLIC BLOOD PRESSURE: 56 MMHG | SYSTOLIC BLOOD PRESSURE: 158 MMHG

## 2022-03-26 LAB
ALBUMIN SERPL BCP-MCNC: 3.1 G/DL (ref 3.4–5)
ALBUMIN/GLOB SERPL: 1.1 {RATIO} (ref 1.1–1.5)
ALP SERPL-CCNC: 43 IU/L (ref 46–116)
ALT SERPL W P-5'-P-CCNC: 23 U/L (ref 12–78)
ANION GAP SERPL CALCULATED.3IONS-SCNC: 10 MMOL/L (ref 8–16)
AST SERPL W P-5'-P-CCNC: 28 U/L (ref 10–37)
BASOPHILS # BLD AUTO: 0 X10'3 (ref 0–0.2)
BASOPHILS NFR BLD AUTO: 0.1 % (ref 0–1)
BILIRUB SERPL-MCNC: 0.3 MG/DL (ref 0.1–1)
BUN SERPL-MCNC: 41 MG/DL (ref 7–18)
BUN/CREAT SERPL: 24.7 (ref 6.6–38)
CALCIUM SERPL-MCNC: 8.5 MG/DL (ref 8.5–10.1)
CHLORIDE SERPL-SCNC: 111 MMOL/L (ref 99–107)
CO2 SERPL-SCNC: 22.4 MMOL/L (ref 24–32)
CREAT SERPL-MCNC: 1.66 MG/DL (ref 0.4–0.9)
EOSINOPHIL # BLD AUTO: 0 X10'3 (ref 0–0.9)
EOSINOPHIL NFR BLD AUTO: 0.1 % (ref 0–6)
ERYTHROCYTE [DISTWIDTH] IN BLOOD BY AUTOMATED COUNT: 14.6 % (ref 11.5–14.5)
GFR SERPL CREATININE-BSD FRML MDRD: 30 ML/MIN
GLUCOSE SERPL-MCNC: 146 MG/DL (ref 70–104)
HCT VFR BLD AUTO: 24.7 % (ref 35–45)
HGB BLD-MCNC: 8 G/DL (ref 12–16)
LYMPHOCYTES # BLD AUTO: 0.5 X10'3 (ref 1.1–4.8)
LYMPHOCYTES NFR BLD AUTO: 6.7 % (ref 21–51)
MAGNESIUM SERPL-MCNC: 2.3 MG/DL (ref 1.5–2.4)
MCH RBC QN AUTO: 28.7 PG (ref 27–31)
MCHC RBC AUTO-ENTMCNC: 32.3 G/DL (ref 33–36.5)
MCV RBC AUTO: 89 FL (ref 78–98)
MONOCYTES # BLD AUTO: 0.4 X10'3 (ref 0–0.9)
MONOCYTES NFR BLD AUTO: 6 % (ref 2–12)
NEUTROPHILS # BLD AUTO: 5.9 X10'3 (ref 1.8–7.7)
NEUTROPHILS NFR BLD AUTO: 87.1 % (ref 42–75)
PHOSPHATE SERPL-MCNC: 4.5 MG/DL (ref 2.3–4.5)
PLATELET # BLD AUTO: 108 X10'3 (ref 140–440)
PMV BLD AUTO: 8.4 FL (ref 7.4–10.4)
POTASSIUM SERPL-SCNC: 3.8 MMOL/L (ref 3.5–5.1)
PROT SERPL-MCNC: 6 G/DL (ref 6.4–8.2)
RBC # BLD AUTO: 2.77 X10'6 (ref 4.2–5.6)
SODIUM SERPL-SCNC: 143 MMOL/L (ref 135–145)
WBC # BLD AUTO: 6.8 X10'3 (ref 4.5–11)

## 2022-03-26 PROCEDURE — 4B02XSZ MEASUREMENT OF CARDIAC PACEMAKER, EXTERNAL APPROACH: ICD-10-PCS | Performed by: INTERNAL MEDICINE

## 2022-03-26 RX ADMIN — HYDRALAZINE HYDROCHLORIDE PRN MG: 20 INJECTION INTRAMUSCULAR; INTRAVENOUS at 16:35

## 2022-03-26 RX ADMIN — HEPARIN SODIUM SCH UNIT: 5000 INJECTION, SOLUTION INTRAVENOUS; SUBCUTANEOUS at 08:14

## 2022-03-26 RX ADMIN — METHYLPREDNISOLONE SODIUM SUCCINATE SCH MG: 40 INJECTION, POWDER, FOR SOLUTION INTRAMUSCULAR; INTRAVENOUS at 08:14

## 2022-03-26 RX ADMIN — METHYLPREDNISOLONE SODIUM SUCCINATE SCH MG: 40 INJECTION, POWDER, FOR SOLUTION INTRAMUSCULAR; INTRAVENOUS at 16:30

## 2022-03-26 RX ADMIN — HYDRALAZINE HYDROCHLORIDE PRN MG: 20 INJECTION INTRAMUSCULAR; INTRAVENOUS at 09:54

## 2022-03-26 RX ADMIN — PANTOPRAZOLE SODIUM SCH MLS/HR: 40 INJECTION, POWDER, FOR SOLUTION INTRAVENOUS at 08:00

## 2022-03-26 RX ADMIN — DOCUSATE SODIUM SCH MG: 100 CAPSULE, LIQUID FILLED ORAL at 19:15

## 2022-03-26 RX ADMIN — Medication SCH MG: at 19:17

## 2022-03-26 RX ADMIN — HEPARIN SODIUM SCH UNIT: 5000 INJECTION, SOLUTION INTRAVENOUS; SUBCUTANEOUS at 19:17

## 2022-03-26 RX ADMIN — PANTOPRAZOLE SODIUM SCH MG: 40 TABLET, DELAYED RELEASE ORAL at 09:54

## 2022-03-26 RX ADMIN — DOCUSATE SODIUM SCH MG: 100 CAPSULE, LIQUID FILLED ORAL at 08:15

## 2022-03-26 NOTE — NUR
Pt is an 82 yo female, admitted 03/24/2022, day 2 of hospitalization, full code, Multiple 
allergies, No isolation, Bilateral  soft wrist restraints in place, documentation and orders 
in accordance with facility policies and procedures. Critical care had been requested by 
 from ER to admit to icu for further care s/p floating a temporary transvenous 
pacer for complete heart block.  The patient, according to the grand daughter by the 
bedside, has history of refractory hypertension that is on several meds including 2 agents 
that can cause bradycardia including metoprolol and clonidine.  she follows with  
and .  She is said to have had CKD.  

Presented with bradycardia and was immediately intubated and a transvenous pacemaker was 
placed by the ER.  Other pmhx include hyperlipidemia, SVT, carotid artery stenosis status 
post endarterectomy, CAD status post stenting to the RCA and LAD in 2021, first-degree AV 
block with left axis deviation and right bundle branch block, paroxysmal atrial 
fibrillation, hypertension, hyperlipidemia. Upon presentation to EMS services patient was in 
a third-degree heart block.  Emergency department physician's documentation reveals the 
patient had chest pain beginning at 01 100 this morning and worsened prior to arrival.  Her 
blood pressure was significantly elevated on arrival.  She is on AV marie blocking agents 
with metoprolol.  Intubated and in a paced rhythm.

     Currently, Pt is s/p pacer placement, arrived in ICU 1645: pt remains intubated, Awake 
alert following commands, afebrile 99.5 (bladder). Restraints removed at 2040. Pacer site 
left chest wall, dressing intact, left arm sling in placed. Pt HR , rhythm varies from 
V-paced to AV paced with sometimes flutter waves seen or P waves seen at a slower rate. Echo 
03/24/2022 shows EF 70-75%. Good pulses, cap refill good, no edema noted. Pt has NS infusing 
at 40 ml's hr via REJ cordis, Which was removed at 2140. Fentanyl infusing at 35 mcgs 
(3.5ml's) via rt groin QLC, all ports f/p, dressing changed, clean dry intact, site with 
notable ecchymosis, no hematoma palpated. Fentanyl stopped prior to extubation. Pt was 
intubated with 7.5 ETT, Pt was extubated at 2040 to NC 2L oxygen, saturation 100%. 
Respiratory issue noted in previous detailed documentation note. At this time, RR 18, fine 
Inspiratory wheeze heard, diminished at bases. Breath sounds, equal symmetrical, non 
labored. Hypoactive bowel sounds, soft non tender, No  BM at this time. OGT removed upon 
extubation. Pt passed Nursing swallow evel at 2100. tolerating ice chips and water. Glucose 
checks Q6 hours. Jones draining CYU /hr, secured to leg. Skin intact, Optifoam to 
sacral area. Pt observed resting VSS, pt remains safe at this time. Continue to monitor. 
Restraint orders removed from chart and under interventions.

## 2022-03-26 NOTE — NUR
Called Dr Pedraza to restart patient's home Catapress. Restarted Catapress .3mg in AM and PM 
and .2mg in afternoon. Pt received .3 mg total this PM . First pulled out .1mg from initial 
order then when order was updated to .3mg I pulled an additional .2 mg and administered to 
the patient, for a total of .3mg tonight.

## 2022-03-27 VITALS — DIASTOLIC BLOOD PRESSURE: 55 MMHG | SYSTOLIC BLOOD PRESSURE: 104 MMHG

## 2022-03-27 VITALS — DIASTOLIC BLOOD PRESSURE: 51 MMHG | SYSTOLIC BLOOD PRESSURE: 92 MMHG

## 2022-03-27 VITALS — DIASTOLIC BLOOD PRESSURE: 36 MMHG | SYSTOLIC BLOOD PRESSURE: 108 MMHG

## 2022-03-27 VITALS — SYSTOLIC BLOOD PRESSURE: 112 MMHG | DIASTOLIC BLOOD PRESSURE: 57 MMHG

## 2022-03-27 VITALS — DIASTOLIC BLOOD PRESSURE: 46 MMHG | SYSTOLIC BLOOD PRESSURE: 102 MMHG

## 2022-03-27 VITALS — DIASTOLIC BLOOD PRESSURE: 42 MMHG | SYSTOLIC BLOOD PRESSURE: 94 MMHG

## 2022-03-27 VITALS — SYSTOLIC BLOOD PRESSURE: 94 MMHG | DIASTOLIC BLOOD PRESSURE: 30 MMHG

## 2022-03-27 VITALS — DIASTOLIC BLOOD PRESSURE: 40 MMHG | SYSTOLIC BLOOD PRESSURE: 99 MMHG

## 2022-03-27 VITALS — DIASTOLIC BLOOD PRESSURE: 56 MMHG | SYSTOLIC BLOOD PRESSURE: 133 MMHG

## 2022-03-27 VITALS — SYSTOLIC BLOOD PRESSURE: 100 MMHG | DIASTOLIC BLOOD PRESSURE: 40 MMHG

## 2022-03-27 VITALS — DIASTOLIC BLOOD PRESSURE: 43 MMHG | SYSTOLIC BLOOD PRESSURE: 112 MMHG

## 2022-03-27 LAB
ALBUMIN SERPL BCP-MCNC: 2.9 G/DL (ref 3.4–5)
ALBUMIN/GLOB SERPL: 1 {RATIO} (ref 1.1–1.5)
ALP SERPL-CCNC: 43 IU/L (ref 46–116)
ALT SERPL W P-5'-P-CCNC: 22 U/L (ref 12–78)
ANION GAP SERPL CALCULATED.3IONS-SCNC: 13 MMOL/L (ref 8–16)
AST SERPL W P-5'-P-CCNC: 28 U/L (ref 10–37)
BASOPHILS # BLD AUTO: 0 X10'3 (ref 0–0.2)
BASOPHILS NFR BLD AUTO: 0.1 % (ref 0–1)
BILIRUB SERPL-MCNC: 0.2 MG/DL (ref 0.1–1)
BUN SERPL-MCNC: 51 MG/DL (ref 7–18)
BUN/CREAT SERPL: 29 (ref 6.6–38)
CALCIUM SERPL-MCNC: 9 MG/DL (ref 8.5–10.1)
CHLORIDE SERPL-SCNC: 101 MMOL/L (ref 99–107)
CO2 SERPL-SCNC: 25.3 MMOL/L (ref 24–32)
CREAT SERPL-MCNC: 1.76 MG/DL (ref 0.4–0.9)
EOSINOPHIL # BLD AUTO: 0 X10'3 (ref 0–0.9)
EOSINOPHIL NFR BLD AUTO: 0 % (ref 0–6)
ERYTHROCYTE [DISTWIDTH] IN BLOOD BY AUTOMATED COUNT: 14.6 % (ref 11.5–14.5)
GFR SERPL CREATININE-BSD FRML MDRD: 28 ML/MIN
GLUCOSE SERPL-MCNC: 137 MG/DL (ref 70–104)
HCT VFR BLD AUTO: 23.2 % (ref 35–45)
HGB BLD-MCNC: 7.7 G/DL (ref 12–16)
LYMPHOCYTES # BLD AUTO: 0.8 X10'3 (ref 1.1–4.8)
LYMPHOCYTES NFR BLD AUTO: 10.6 % (ref 21–51)
MAGNESIUM SERPL-MCNC: 2.3 MG/DL (ref 1.5–2.4)
MCH RBC QN AUTO: 28.9 PG (ref 27–31)
MCHC RBC AUTO-ENTMCNC: 33.2 G/DL (ref 33–36.5)
MCV RBC AUTO: 87.1 FL (ref 78–98)
MONOCYTES # BLD AUTO: 0.5 X10'3 (ref 0–0.9)
MONOCYTES NFR BLD AUTO: 6.7 % (ref 2–12)
NEUTROPHILS # BLD AUTO: 6.3 X10'3 (ref 1.8–7.7)
NEUTROPHILS NFR BLD AUTO: 82.6 % (ref 42–75)
PHOSPHATE SERPL-MCNC: 3.5 MG/DL (ref 2.3–4.5)
PLATELET # BLD AUTO: 135 X10'3 (ref 140–440)
PMV BLD AUTO: 8.4 FL (ref 7.4–10.4)
POTASSIUM SERPL-SCNC: 3.8 MMOL/L (ref 3.5–5.1)
PROT SERPL-MCNC: 5.7 G/DL (ref 6.4–8.2)
RBC # BLD AUTO: 2.67 X10'6 (ref 4.2–5.6)
SODIUM SERPL-SCNC: 139 MMOL/L (ref 135–145)
WBC # BLD AUTO: 7.7 X10'3 (ref 4.5–11)

## 2022-03-27 RX ADMIN — DOCUSATE SODIUM SCH MG: 100 CAPSULE, LIQUID FILLED ORAL at 09:04

## 2022-03-27 RX ADMIN — DOCUSATE SODIUM SCH MG: 100 CAPSULE, LIQUID FILLED ORAL at 19:25

## 2022-03-27 RX ADMIN — PANTOPRAZOLE SODIUM SCH MG: 40 TABLET, DELAYED RELEASE ORAL at 09:47

## 2022-03-27 RX ADMIN — PANTOPRAZOLE SODIUM SCH MLS/HR: 40 INJECTION, POWDER, FOR SOLUTION INTRAVENOUS at 08:00

## 2022-03-27 RX ADMIN — ONDANSETRON PRN MG: 4 TABLET, ORALLY DISINTEGRATING ORAL at 20:43

## 2022-03-27 RX ADMIN — SODIUM CHLORIDE SCH MLS/HR: 9 INJECTION INTRAMUSCULAR; INTRAVENOUS; SUBCUTANEOUS at 19:55

## 2022-03-27 RX ADMIN — Medication SCH TAB.SR: at 19:36

## 2022-03-27 RX ADMIN — Medication SCH MG: at 08:00

## 2022-03-27 NOTE — NUR
Pt is an 82 yo female, admitted 03/24/2022, day 3 of hospitalization, full code, Multiple 
allergies, No isolation, Bilateral  soft wrist restraints in place, documentation and orders 
in accordance with facility policies and procedures. Critical care had been requested by 
 from ER to admit to icu for further care s/p floating a temporary transvenous 
pacer for complete heart block.  The patient, according to the grand daughter by the 
bedside, has history of refractory hypertension that is on several meds including 2 agents 
that can cause bradycardia including metoprolol and clonidine.  she follows with  
and .  She is said to have had CKD.  

Presented with bradycardia and was immediately intubated and a transvenous pacemaker was 
placed by the ER.  Other pmhx include hyperlipidemia, SVT, carotid artery stenosis status 
post endarterectomy, CAD status post stenting to the RCA and LAD in 2021, first-degree AV 
block with left axis deviation and right bundle branch block, paroxysmal atrial 
fibrillation, hypertension, hyperlipidemia. Upon presentation to EMS services patient was in 
a third-degree heart block.  Emergency department physician's documentation reveals the 
patient had chest pain beginning at 01 100 this morning and worsened prior to arrival.  Her 
blood pressure was significantly elevated on arrival.  She is on AV marie blocking agents 
with metoprolol.  Intubated and in a paced rhythm.

     Currently, pt is Awake alert oriented, following commands, moves all extremities, 
afebrile 99.5 (bladder). Pacer site left chest wall, dressing intact, left arm sling in 
placed. Home medication Catapress reordered per Dr Pedraza, Pt HR 95, AV and V paced. Echo 
03/24/2022 shows EF 70-75%. BP more manageable BP 99/60Good pulses, cap refill good, no 
edema noted. RT groin QLC, all ports f/p, dressing changed, clean dry intact, site with 
notable ecchymosis, no hematoma palpated. Oxygen NC 2L , saturation 100%. RR 18, fine I/E 
wheeze heard, diminished at bases. Breath sounds, equal symmetrical, non labored. Hypoactive 
bowel sounds, soft non tender, No  BM at this time. Heart healthy diet. Jones draining CYU 
50 /hr, secured to leg. Skin intact, Optifoam to sacral area. Pt observed resting VSS, pt 
remains safe at this time. Continue to monitor.

## 2022-03-27 NOTE — NUR
Reported off to Karissa in the ACCE Unit, Pt doing well, denies pain, Paced at 87 bpm, 
Instructions given re: shoulder sling precautions.  Pacer incisions precautions were 
discussed

## 2022-03-27 NOTE — NUR
Dr Stephens came to see pt. Plan is to D/C tomorrow. Will pull de la rosa catheter now. Will leave R. 
femoral IV until the am then pull it. MD ok'd to non-admin lisinopril this evening. Pt B/P 
is 108/36. Did admin metoprolol.

## 2022-03-27 NOTE — NUR
page [phone]

Message:  [phone] would like to know the plan for her H/H. Are you 
going to transfuse or not? [phone] Karissa

## 2022-03-27 NOTE — NUR
Patient in room ICU 2039. I have received report from Randolph DIAZ  and had the opportunity to 
ask questions and assume patient care.

## 2022-03-27 NOTE — NUR
Problems reprioritized. Patient report given, questions answered & plan of care reviewed 
with Sayra DIAZ.

## 2022-03-27 NOTE — NUR
Lab notified of gram + rods seen today in aerobic bottle drawn on 3/24 from the L arm. Spoke 
to Dr Molina who will see about putting in abx.

## 2022-03-28 VITALS — DIASTOLIC BLOOD PRESSURE: 55 MMHG | SYSTOLIC BLOOD PRESSURE: 142 MMHG

## 2022-03-28 VITALS — SYSTOLIC BLOOD PRESSURE: 145 MMHG | DIASTOLIC BLOOD PRESSURE: 48 MMHG

## 2022-03-28 VITALS — DIASTOLIC BLOOD PRESSURE: 57 MMHG | SYSTOLIC BLOOD PRESSURE: 182 MMHG

## 2022-03-28 VITALS — DIASTOLIC BLOOD PRESSURE: 47 MMHG | SYSTOLIC BLOOD PRESSURE: 125 MMHG

## 2022-03-28 VITALS — DIASTOLIC BLOOD PRESSURE: 43 MMHG | SYSTOLIC BLOOD PRESSURE: 139 MMHG

## 2022-03-28 VITALS — SYSTOLIC BLOOD PRESSURE: 146 MMHG | DIASTOLIC BLOOD PRESSURE: 72 MMHG

## 2022-03-28 VITALS — DIASTOLIC BLOOD PRESSURE: 48 MMHG | SYSTOLIC BLOOD PRESSURE: 101 MMHG

## 2022-03-28 LAB
ALBUMIN SERPL BCP-MCNC: 2.9 G/DL (ref 3.4–5)
ALBUMIN/GLOB SERPL: 1 {RATIO} (ref 1.1–1.5)
ALP SERPL-CCNC: 45 IU/L (ref 46–116)
ALT SERPL W P-5'-P-CCNC: 21 U/L (ref 12–78)
ANION GAP SERPL CALCULATED.3IONS-SCNC: 11 MMOL/L (ref 8–16)
AST SERPL W P-5'-P-CCNC: 21 U/L (ref 10–37)
BASOPHILS # BLD AUTO: 0 X10'3 (ref 0–0.2)
BASOPHILS NFR BLD AUTO: 0.1 % (ref 0–1)
BILIRUB SERPL-MCNC: 0.2 MG/DL (ref 0.1–1)
BUN SERPL-MCNC: 63 MG/DL (ref 7–18)
BUN/CREAT SERPL: 32.1 (ref 6.6–38)
CALCIUM SERPL-MCNC: 8.9 MG/DL (ref 8.5–10.1)
CHLORIDE SERPL-SCNC: 98 MMOL/L (ref 99–107)
CO2 SERPL-SCNC: 26.2 MMOL/L (ref 24–32)
CREAT SERPL-MCNC: 1.96 MG/DL (ref 0.4–0.9)
EOSINOPHIL # BLD AUTO: 0.1 X10'3 (ref 0–0.9)
EOSINOPHIL NFR BLD AUTO: 1.8 % (ref 0–6)
ERYTHROCYTE [DISTWIDTH] IN BLOOD BY AUTOMATED COUNT: 14.7 % (ref 11.5–14.5)
GFR SERPL CREATININE-BSD FRML MDRD: 24 ML/MIN
GLUCOSE SERPL-MCNC: 98 MG/DL (ref 70–104)
HCT VFR BLD AUTO: 24.9 % (ref 35–45)
HGB BLD-MCNC: 8.3 G/DL (ref 12–16)
LYMPHOCYTES # BLD AUTO: 1.2 X10'3 (ref 1.1–4.8)
LYMPHOCYTES NFR BLD AUTO: 17.5 % (ref 21–51)
MAGNESIUM SERPL-MCNC: 2.4 MG/DL (ref 1.5–2.4)
MCH RBC QN AUTO: 28.9 PG (ref 27–31)
MCHC RBC AUTO-ENTMCNC: 33.3 G/DL (ref 33–36.5)
MCV RBC AUTO: 86.9 FL (ref 78–98)
MONOCYTES # BLD AUTO: 0.8 X10'3 (ref 0–0.9)
MONOCYTES NFR BLD AUTO: 10.9 % (ref 2–12)
NEUTROPHILS # BLD AUTO: 5 X10'3 (ref 1.8–7.7)
NEUTROPHILS NFR BLD AUTO: 69.7 % (ref 42–75)
PHOSPHATE SERPL-MCNC: 3.8 MG/DL (ref 2.3–4.5)
PLATELET # BLD AUTO: 153 X10'3 (ref 140–440)
PMV BLD AUTO: 7.8 FL (ref 7.4–10.4)
POTASSIUM SERPL-SCNC: 3.7 MMOL/L (ref 3.5–5.1)
PROT SERPL-MCNC: 5.9 G/DL (ref 6.4–8.2)
RBC # BLD AUTO: 2.86 X10'6 (ref 4.2–5.6)
SODIUM SERPL-SCNC: 135 MMOL/L (ref 135–145)
WBC # BLD AUTO: 7.1 X10'3 (ref 4.5–11)

## 2022-03-28 RX ADMIN — Medication SCH TAB.SR: at 08:06

## 2022-03-28 RX ADMIN — SODIUM CHLORIDE SCH MLS/HR: 9 INJECTION INTRAMUSCULAR; INTRAVENOUS; SUBCUTANEOUS at 03:00

## 2022-03-28 RX ADMIN — Medication SCH TAB.SR: at 20:31

## 2022-03-28 RX ADMIN — HYDRALAZINE HYDROCHLORIDE PRN MG: 20 INJECTION INTRAMUSCULAR; INTRAVENOUS at 03:02

## 2022-03-28 RX ADMIN — DOCUSATE SODIUM SCH MG: 100 CAPSULE, LIQUID FILLED ORAL at 08:06

## 2022-03-28 RX ADMIN — ONDANSETRON PRN MG: 4 TABLET, ORALLY DISINTEGRATING ORAL at 21:00

## 2022-03-28 RX ADMIN — PANTOPRAZOLE SODIUM SCH MG: 40 TABLET, DELAYED RELEASE ORAL at 08:07

## 2022-03-28 RX ADMIN — ONDANSETRON PRN MG: 4 TABLET, ORALLY DISINTEGRATING ORAL at 09:30

## 2022-03-28 RX ADMIN — DOCUSATE SODIUM SCH MG: 100 CAPSULE, LIQUID FILLED ORAL at 20:31

## 2022-03-28 NOTE — NUR
Reassessment: Pt was extubated 3/25 and has been placed on Renal/Heart 

healthy diet w/ avg intake 75% of meals meeting est nutrient needs at this 

time, though recommend removal of renal diet if MD agreeable as not 

currently indicated w/ this pt, electrolytes WNL. LBM 3/25 receiving 

routine colace. Will continue to monitor.

Recommendations:

1. Consider liberalizing to Regular diet given age and electrolytes WNL

2. Bowel care per rx

3. Weekly wts

-------------------------------------------------------------------------------

Addendum: 03/28/22 at 0919 by Vel Bean RD

-------------------------------------------------------------------------------

Amended: Links added.

## 2022-03-28 NOTE — NUR
Message:  [phone] Darcie Lisa Patient has two orders for Catapess, 0.2 (home dose) to be given in 
afternoon, 0.3 hospital am/pm do you want to keep them both? [phone]

## 2022-03-28 NOTE — NUR
Patient in room . I have received report from EMILY CORONEL   and had the opportunity 
to ask questions and assume patient care.

-------------------------------------------------------------------------------

Addendum: 03/28/22 at 0637 by Karissa Chan RN

-------------------------------------------------------------------------------

Patient in room . I have received report from EMILY FRAZIER   and had the opportunity to 
ask questions and assume patient care.  NOT BERNA DIAZ

## 2022-03-28 NOTE — NUR
Problems reprioritized. Patient report given, questions answered & plan of care reviewed 
with Jorge DIAZ.

## 2022-03-28 NOTE — NUR
Doctor Hair orozco, and he was informed of the positive blood cultures.  Central 
line to come out.  PIV taken out this am.  PIV ok if needed.

## 2022-03-29 VITALS — SYSTOLIC BLOOD PRESSURE: 138 MMHG | DIASTOLIC BLOOD PRESSURE: 78 MMHG

## 2022-03-29 VITALS — SYSTOLIC BLOOD PRESSURE: 146 MMHG | DIASTOLIC BLOOD PRESSURE: 72 MMHG

## 2022-03-29 VITALS — SYSTOLIC BLOOD PRESSURE: 148 MMHG | DIASTOLIC BLOOD PRESSURE: 73 MMHG

## 2022-03-29 LAB
ALBUMIN SERPL BCP-MCNC: 3.2 G/DL (ref 3.4–5)
ALBUMIN/GLOB SERPL: 0.9 {RATIO} (ref 1.1–1.5)
ALP SERPL-CCNC: 45 IU/L (ref 46–116)
ALT SERPL W P-5'-P-CCNC: 23 U/L (ref 12–78)
ANION GAP SERPL CALCULATED.3IONS-SCNC: 7 MMOL/L (ref 8–16)
AST SERPL W P-5'-P-CCNC: 23 U/L (ref 10–37)
BASOPHILS # BLD AUTO: 0 X10'3 (ref 0–0.2)
BASOPHILS NFR BLD AUTO: 0.2 % (ref 0–1)
BILIRUB SERPL-MCNC: 0.3 MG/DL (ref 0.1–1)
BUN SERPL-MCNC: 53 MG/DL (ref 7–18)
BUN/CREAT SERPL: 32.7 (ref 6.6–38)
CALCIUM SERPL-MCNC: 8.7 MG/DL (ref 8.5–10.1)
CHLORIDE SERPL-SCNC: 104 MMOL/L (ref 99–107)
CO2 SERPL-SCNC: 29.1 MMOL/L (ref 24–32)
CREAT SERPL-MCNC: 1.62 MG/DL (ref 0.4–0.9)
EOSINOPHIL # BLD AUTO: 0.1 X10'3 (ref 0–0.9)
EOSINOPHIL NFR BLD AUTO: 2.3 % (ref 0–6)
ERYTHROCYTE [DISTWIDTH] IN BLOOD BY AUTOMATED COUNT: 14.7 % (ref 11.5–14.5)
GFR SERPL CREATININE-BSD FRML MDRD: 30 ML/MIN
GLUCOSE SERPL-MCNC: 107 MG/DL (ref 70–104)
HCT VFR BLD AUTO: 27.8 % (ref 35–45)
HGB BLD-MCNC: 9.1 G/DL (ref 12–16)
LYMPHOCYTES # BLD AUTO: 1 X10'3 (ref 1.1–4.8)
LYMPHOCYTES NFR BLD AUTO: 17 % (ref 21–51)
MAGNESIUM SERPL-MCNC: 2.3 MG/DL (ref 1.5–2.4)
MCH RBC QN AUTO: 28.7 PG (ref 27–31)
MCHC RBC AUTO-ENTMCNC: 32.9 G/DL (ref 33–36.5)
MCV RBC AUTO: 87.2 FL (ref 78–98)
MONOCYTES # BLD AUTO: 0.7 X10'3 (ref 0–0.9)
MONOCYTES NFR BLD AUTO: 12.1 % (ref 2–12)
NEUTROPHILS # BLD AUTO: 4 X10'3 (ref 1.8–7.7)
NEUTROPHILS NFR BLD AUTO: 68.4 % (ref 42–75)
PHOSPHATE SERPL-MCNC: 3.7 MG/DL (ref 2.3–4.5)
PLATELET # BLD AUTO: 154 X10'3 (ref 140–440)
PMV BLD AUTO: 7.7 FL (ref 7.4–10.4)
POTASSIUM SERPL-SCNC: 3.7 MMOL/L (ref 3.5–5.1)
PROT SERPL-MCNC: 6.6 G/DL (ref 6.4–8.2)
RBC # BLD AUTO: 3.19 X10'6 (ref 4.2–5.6)
SODIUM SERPL-SCNC: 140 MMOL/L (ref 135–145)
WBC # BLD AUTO: 5.9 X10'3 (ref 4.5–11)

## 2022-03-29 RX ADMIN — DOCUSATE SODIUM SCH MG: 100 CAPSULE, LIQUID FILLED ORAL at 09:29

## 2022-03-29 RX ADMIN — ONDANSETRON PRN MG: 4 TABLET, ORALLY DISINTEGRATING ORAL at 07:59

## 2022-03-29 RX ADMIN — PANTOPRAZOLE SODIUM SCH MG: 40 TABLET, DELAYED RELEASE ORAL at 08:28

## 2022-03-29 RX ADMIN — Medication SCH TAB.SR: at 08:00

## 2022-03-29 RX ADMIN — SODIUM CHLORIDE SCH MLS/HR: 9 INJECTION INTRAMUSCULAR; INTRAVENOUS; SUBCUTANEOUS at 01:55

## 2022-03-29 NOTE — NUR
PT STABLE FOR DISCHARGE PER MD. DISCHARGE INSTRUCTIONS REVIEWED WITH PT. APPROPRIATE 
PAPERWORK WAS SIGNED. PIV REMOVED WITH TIP INTACT, TOLERATED WELL BY PT. KEFLEX PRESCRIPTION 
FAXED TO [facility]. TELE UNIT REMOVED. PT WHEELED DOWN TO PRIVATE VEHICLE BY STAFF. PT 
DISCHARGED TO HER HOME.

## 2022-03-29 NOTE — NUR
Apparently wrong patient label was placed on [facility] culture for this patient 
yesterday. They put label for Amanda Butcher that was in room 311, this patient was in room 
312. Lab said they called multiple times yesterday and spoke to someone but nothing was 
ordered for patient until aroun 1600, and then still patient had wrong label that was found 
out this morning. Lab said I can come re-label  even though it has been over 24hrs. And, 
they will speak to microbiology when they come in. I appreciate if lab would call and talk 
to charge nurse, as I would have called them back but never received a message, even though 
they said they called multiple times. 

-------------------------------------------------------------------------------

Addendum: 03/29/22 at 0809 by Dannielle Viramontes RN

-------------------------------------------------------------------------------

I spoke with microbiology and they cannot run culture from [facility] tip. I will let the MD 
know.

## 2022-03-29 NOTE — NUR
Patient in room . I have received report from  EMILY BRANTLEY,  and had the opportunity 
to ask questions and assume patient care.

## 2023-01-12 ENCOUNTER — HOSPITAL ENCOUNTER (EMERGENCY)
Dept: HOSPITAL 94 - ER | Age: 83
Discharge: LEFT BEFORE DISCHARGE | End: 2023-01-12
Payer: MEDICARE

## 2023-01-12 VITALS — WEIGHT: 129.28 LBS | HEIGHT: 62 IN | BODY MASS INDEX: 23.79 KG/M2

## 2023-01-12 VITALS — DIASTOLIC BLOOD PRESSURE: 54 MMHG | SYSTOLIC BLOOD PRESSURE: 153 MMHG

## 2023-01-12 DIAGNOSIS — R06.02: Primary | ICD-10-CM

## 2023-01-12 DIAGNOSIS — Z53.21: ICD-10-CM

## 2023-01-12 LAB
ALBUMIN SERPL BCP-MCNC: 3.4 G/DL (ref 3.4–5)
ALBUMIN/GLOB SERPL: 0.9 {RATIO} (ref 1.1–1.5)
ALP SERPL-CCNC: 87 IU/L (ref 46–116)
ALT SERPL W P-5'-P-CCNC: 14 U/L (ref 12–78)
ANION GAP SERPL CALCULATED.3IONS-SCNC: 8 MMOL/L (ref 8–16)
AST SERPL W P-5'-P-CCNC: 15 U/L (ref 10–37)
BASOPHILS # BLD AUTO: 0 X10'3 (ref 0–0.2)
BASOPHILS NFR BLD AUTO: 0.4 % (ref 0–1)
BILIRUB SERPL-MCNC: 0.7 MG/DL (ref 0.1–1)
BUN SERPL-MCNC: 29 MG/DL (ref 7–18)
BUN/CREAT SERPL: 24.6 (ref 6.6–38)
CALCIUM SERPL-MCNC: 9.2 MG/DL (ref 8.5–10.1)
CHLORIDE SERPL-SCNC: 100 MMOL/L (ref 99–107)
CO2 SERPL-SCNC: 28.3 MMOL/L (ref 24–32)
CREAT SERPL-MCNC: 1.18 MG/DL (ref 0.4–0.9)
EOSINOPHIL # BLD AUTO: 0.1 X10'3 (ref 0–0.9)
EOSINOPHIL NFR BLD AUTO: 2.2 % (ref 0–6)
ERYTHROCYTE [DISTWIDTH] IN BLOOD BY AUTOMATED COUNT: 17.4 % (ref 11.5–14.5)
GFR SERPL CREATININE-BSD FRML MDRD: 44 ML/MIN
GLUCOSE SERPL-MCNC: 112 MG/DL (ref 70–104)
HCT VFR BLD AUTO: 35.4 % (ref 35–45)
HGB BLD-MCNC: 11.2 G/DL (ref 12–16)
LYMPHOCYTES # BLD AUTO: 0.9 X10'3 (ref 1.1–4.8)
LYMPHOCYTES NFR BLD AUTO: 15 % (ref 21–51)
MCH RBC QN AUTO: 25.6 PG (ref 27–31)
MCHC RBC AUTO-ENTMCNC: 31.6 G/DL (ref 33–36.5)
MCV RBC AUTO: 80.8 FL (ref 78–98)
MONOCYTES # BLD AUTO: 0.6 X10'3 (ref 0–0.9)
MONOCYTES NFR BLD AUTO: 9.9 % (ref 2–12)
NEUTROPHILS # BLD AUTO: 4.3 X10'3 (ref 1.8–7.7)
NEUTROPHILS NFR BLD AUTO: 72.5 % (ref 42–75)
PLATELET # BLD AUTO: 159 X10'3 (ref 140–440)
PMV BLD AUTO: 7.4 FL (ref 7.4–10.4)
POTASSIUM SERPL-SCNC: 4.8 MMOL/L (ref 3.5–5.1)
PROT SERPL-MCNC: 7.1 G/DL (ref 6.4–8.2)
RBC # BLD AUTO: 4.38 X10'6 (ref 4.2–5.6)
SODIUM SERPL-SCNC: 136 MMOL/L (ref 135–145)
WBC # BLD AUTO: 6 X10'3 (ref 4.5–11)

## 2023-01-12 PROCEDURE — 84484 ASSAY OF TROPONIN QUANT: CPT

## 2023-01-12 PROCEDURE — 83880 ASSAY OF NATRIURETIC PEPTIDE: CPT

## 2023-01-12 PROCEDURE — 93005 ELECTROCARDIOGRAM TRACING: CPT

## 2023-01-12 PROCEDURE — 85025 COMPLETE CBC W/AUTO DIFF WBC: CPT

## 2023-01-12 PROCEDURE — 36415 COLL VENOUS BLD VENIPUNCTURE: CPT

## 2023-01-12 PROCEDURE — 80053 COMPREHEN METABOLIC PANEL: CPT

## 2023-01-12 PROCEDURE — 71046 X-RAY EXAM CHEST 2 VIEWS: CPT

## 2023-06-30 ENCOUNTER — HOSPITAL ENCOUNTER (EMERGENCY)
Dept: HOSPITAL 94 - ER | Age: 83
Discharge: HOME | End: 2023-06-30
Payer: MEDICARE

## 2023-06-30 VITALS — WEIGHT: 127.87 LBS | BODY MASS INDEX: 24.14 KG/M2 | HEIGHT: 61 IN

## 2023-06-30 VITALS — SYSTOLIC BLOOD PRESSURE: 167 MMHG | DIASTOLIC BLOOD PRESSURE: 82 MMHG

## 2023-06-30 DIAGNOSIS — Z90.49: ICD-10-CM

## 2023-06-30 DIAGNOSIS — Z79.2: ICD-10-CM

## 2023-06-30 DIAGNOSIS — Z88.8: ICD-10-CM

## 2023-06-30 DIAGNOSIS — Z79.899: ICD-10-CM

## 2023-06-30 DIAGNOSIS — I48.91: ICD-10-CM

## 2023-06-30 DIAGNOSIS — N18.9: ICD-10-CM

## 2023-06-30 DIAGNOSIS — J44.9: ICD-10-CM

## 2023-06-30 DIAGNOSIS — Z91.013: ICD-10-CM

## 2023-06-30 DIAGNOSIS — B02.9: Primary | ICD-10-CM

## 2023-06-30 PROCEDURE — 99283 EMERGENCY DEPT VISIT LOW MDM: CPT
